# Patient Record
Sex: MALE | URBAN - METROPOLITAN AREA
[De-identification: names, ages, dates, MRNs, and addresses within clinical notes are randomized per-mention and may not be internally consistent; named-entity substitution may affect disease eponyms.]

---

## 2022-11-22 ENCOUNTER — OFFICE VISIT (OUTPATIENT)
Dept: ORTHOPEDIC SURGERY | Age: 52
End: 2022-11-22
Payer: MEDICARE

## 2022-11-22 VITALS
HEIGHT: 75 IN | DIASTOLIC BLOOD PRESSURE: 70 MMHG | TEMPERATURE: 97.2 F | HEART RATE: 70 BPM | WEIGHT: 158.4 LBS | SYSTOLIC BLOOD PRESSURE: 104 MMHG | OXYGEN SATURATION: 98 % | BODY MASS INDEX: 19.7 KG/M2 | RESPIRATION RATE: 18 BRPM

## 2022-11-22 DIAGNOSIS — G89.29 CHRONIC BILATERAL LOW BACK PAIN WITHOUT SCIATICA: Primary | ICD-10-CM

## 2022-11-22 DIAGNOSIS — M54.50 CHRONIC BILATERAL LOW BACK PAIN WITHOUT SCIATICA: Primary | ICD-10-CM

## 2022-11-22 PROBLEM — I10 HYPERTENSION: Status: ACTIVE | Noted: 2022-11-22

## 2022-11-22 PROBLEM — F39 EPISODIC MOOD DISORDER (HCC): Status: ACTIVE | Noted: 2021-02-24

## 2022-11-22 PROCEDURE — 3078F DIAST BP <80 MM HG: CPT | Performed by: PHYSICAL MEDICINE & REHABILITATION

## 2022-11-22 PROCEDURE — 3017F COLORECTAL CA SCREEN DOC REV: CPT | Performed by: PHYSICAL MEDICINE & REHABILITATION

## 2022-11-22 PROCEDURE — 3074F SYST BP LT 130 MM HG: CPT | Performed by: PHYSICAL MEDICINE & REHABILITATION

## 2022-11-22 PROCEDURE — 99203 OFFICE O/P NEW LOW 30 MIN: CPT | Performed by: PHYSICAL MEDICINE & REHABILITATION

## 2022-11-22 PROCEDURE — G8420 CALC BMI NORM PARAMETERS: HCPCS | Performed by: PHYSICAL MEDICINE & REHABILITATION

## 2022-11-22 PROCEDURE — G8427 DOCREV CUR MEDS BY ELIG CLIN: HCPCS | Performed by: PHYSICAL MEDICINE & REHABILITATION

## 2022-11-22 PROCEDURE — G8432 DEP SCR NOT DOC, RNG: HCPCS | Performed by: PHYSICAL MEDICINE & REHABILITATION

## 2022-11-22 RX ORDER — HYDROCHLOROTHIAZIDE 12.5 MG/1
12.5 CAPSULE ORAL DAILY
COMMUNITY
Start: 2022-08-16

## 2022-11-22 RX ORDER — CHLORHEXIDINE GLUCONATE 1.2 MG/ML
RINSE ORAL
COMMUNITY
Start: 2022-10-04

## 2022-11-22 RX ORDER — LOPERAMIDE HYDROCHLORIDE 2 MG/1
2 CAPSULE ORAL
COMMUNITY
Start: 2022-05-12

## 2022-11-22 RX ORDER — NAPROXEN 500 MG/1
500 TABLET ORAL 2 TIMES DAILY
COMMUNITY
Start: 2022-08-02 | End: 2022-11-22 | Stop reason: ALTCHOICE

## 2022-11-22 RX ORDER — DEXTROMETHORPHAN HYDROBROMIDE, GUAIFENESIN 5; 100 MG/5ML; MG/5ML
650 LIQUID ORAL
COMMUNITY
Start: 2022-10-04

## 2022-11-22 RX ORDER — METHYLPREDNISOLONE 4 MG/1
TABLET ORAL
COMMUNITY
Start: 2022-10-04 | End: 2022-11-22

## 2022-11-22 RX ORDER — ONDANSETRON 4 MG/1
4 TABLET, ORALLY DISINTEGRATING ORAL
COMMUNITY
Start: 2022-09-22

## 2022-11-22 RX ORDER — FLUOXETINE HYDROCHLORIDE 20 MG/1
20 CAPSULE ORAL DAILY
COMMUNITY
Start: 2022-10-04

## 2022-11-22 RX ORDER — OMEPRAZOLE 20 MG/1
CAPSULE, DELAYED RELEASE ORAL
COMMUNITY
Start: 2022-10-04

## 2022-11-22 RX ORDER — ARIPIPRAZOLE 5 MG/1
5 TABLET ORAL DAILY
COMMUNITY
Start: 2022-10-04

## 2022-11-22 RX ORDER — METOCLOPRAMIDE 10 MG/1
10 TABLET ORAL
COMMUNITY
Start: 2022-02-22

## 2022-11-22 RX ORDER — PROMETHAZINE HYDROCHLORIDE 25 MG/1
SUPPOSITORY RECTAL
COMMUNITY
Start: 2022-08-29

## 2022-11-22 RX ORDER — IBUPROFEN 800 MG/1
TABLET ORAL
COMMUNITY
Start: 2022-10-04 | End: 2022-11-22

## 2022-11-22 RX ORDER — TRAZODONE HYDROCHLORIDE 50 MG/1
TABLET ORAL
COMMUNITY
Start: 2022-10-01

## 2022-11-22 RX ORDER — AMLODIPINE BESYLATE 10 MG/1
10 TABLET ORAL DAILY
COMMUNITY
Start: 2022-02-14

## 2022-11-22 RX ORDER — CLINDAMYCIN HYDROCHLORIDE 300 MG/1
CAPSULE ORAL
COMMUNITY
Start: 2022-10-04

## 2022-11-22 NOTE — PROGRESS NOTES
[address]  [address], [address]  [address]  Phone: [phone]  Fax: [phone]      Patient: Windy Tellez                                                                              MRN: [id number]        YOB: 1970          AGE: 46 y.o. PCP: Lisbet Gonzales NP  Date:  11/22/22    Reason for Consultation: Back Pain      HPI:  Windy Tellez is a 46 y.o. male with relevant PMH of HTN who presents with low back pain. He works at [employer], normally a . June 2, 2022 he was unloading a truck of frozen food and bent over to  a box and felt sharp pain. [facility]. He then went to [address] [place] [address] and did 10- weeks of PT. He had an MRI of his lumbar spine which demonstrates. He was then referred for a functional capacity evaluation   He was out of work for 5 weeks and returned to work light duty and after recent functional capacity evaluation he returned to work full duty      Neurologic symptoms: No numbness, tingling, weakness, bowel or bladder changes. No recent falls      Location: The pain is located in the low back pain    Radiation: The pain does not radiate . Pain Score: Currently: 7/10    Quality: Pain is of a Achy quality. Aggravating: Pain is exacerbated by standing and bending, twisting  Alleviating:  The pain is alleviated by sitting, lying down, and exercise    Prior Treatments:  Physical therapy: YES 10 weeks at [facility] orthopedic  Functional capacity evaluation [facility]  Toradol injection patient first    Injections:NO    Previous Medications: ibuprofen, naproxen, tylenol , medrol pack   Current Medications:  Previous work-up has included:   MRI lumbar spine 8/23/22-  Mild leveloscoliosis, mild disc desiccation L3-4, L2-3 mild disc extrusion, L3-4 mild anterior disc bulge, L4-5 mild disc bulge      Past Medical History: Past Medical History:   Diagnosis Date    Back pain       Past Surgical History: No past surgical history on file. SocHx:   Social History     Tobacco Use    Smoking status: Every Day     Packs/day: 1.00     Years: 20.00     Pack years: 20.00     Types: Cigarettes    Smokeless tobacco: Never   Substance Use Topics    Alcohol use: Never      FamHx:? No family history on file. Current Medications:    Current Outpatient Medications   Medication Sig Dispense Refill    acetaminophen (TYLENOL) 650 mg TbER Take 650 mg by mouth every eight (8) hours as needed. amLODIPine (NORVASC) 10 mg tablet Take 10 mg by mouth daily. ARIPiprazole (ABILIFY) 5 mg tablet Take 5 mg by mouth daily. chlorhexidine (PERIDEX) 0.12 % solution RINSE AND SPIT 15ML TWICE A DAY      clindamycin (CLEOCIN) 300 mg capsule TAKE 1 CAPSULE BY MOUTH 4 TIMES A DAY UNTIL GONE      FLUoxetine (PROzac) 20 mg capsule Take 20 mg by mouth daily. hydroCHLOROthiazide (MICROZIDE) 12.5 mg capsule Take 12.5 mg by mouth daily. ibuprofen (MOTRIN) 800 mg tablet TAKE 1 TABLET BY MOUTH EVERY 6 TO 8 HOURS AS NEEDED FOR PAIN      loperamide (IMODIUM) 2 mg capsule Take 2 mg by mouth every six (6) hours as needed. methylPREDNISolone (MEDROL DOSEPACK) 4 mg tablet TAKE 6 TABLETS ON DAY 1 AS DIRECTED ON PACKAGE AND DECREASE BY 1 TAB EACH DAY FOR A TOTAL OF 6 DAYS      metoclopramide HCl (REGLAN) 10 mg tablet Take 10 mg by mouth. naproxen (NAPROSYN) 500 mg tablet Take 500 mg by mouth two (2) times a day. omeprazole (PRILOSEC) 20 mg capsule TAKE 1 CAPSULE BY MOUTH ONCE A DAY 30 MIN BEFORE BREAKFAST FOR 90 DAYS      ondansetron (ZOFRAN ODT) 4 mg disintegrating tablet Take 4 mg by mouth every eight (8) hours as needed.       promethazine (PHENERGAN) 25 mg suppository INSERT 1 SUPPOSITORY RECTALLY EVERY SIX HOURS AS NEEDED FOR VOMITING      traZODone (DESYREL) 50 mg tablet TAKE ONE-HALF TO ONE TABLET BY MOUTH EVERY NIGHT        Allergies: Allergies   Allergen Reactions    Lisinopril Anaphylaxis and Swelling     Other reaction(s): anaphylaxis/angioedema          Review of Systems:   Gen:    Denied fevers, chills, malaise, fatigue, weight changes   Resp: Denied shortness of breath, cough, wheezing   CVS: Denied chest pain, palpitations   : Denied urinary urgency, frequency, incontinence   GI: Denied nausea, vomiting, constipation, diarrhea   Skin: Denied rashes, wounds   Psych: Denied anxiety, depression   Vasc: Denied claudication, ulcers   Hem: Denied easy bruising/bleeding   MSK: See HPI   Neuro: See HPI         Physical Exam     Vital Signs: Visit Vitals  /70 (BP 1 Location: Left upper arm, BP Patient Position: Sitting, BP Cuff Size: Small adult)   Pulse 70   Temp 97.2 °F (36.2 °C) (Temporal)   Resp 18   Ht 6' 3\" (1.905 m)   Wt 158 lb 6.4 oz (71.8 kg)   SpO2 98% Comment: RA   BMI 19.80 kg/m²      General: ??????? Well nourished and well developed male without any acute distress   Psychiatric: ?  Alert and oriented x 3 with normal mood    HEENT: ???????? Atraumatic   Respiratory:   Breathing non-labored and non dyspneic   CV: ???????????????? Peripheral pulses intact, no peripheral edema   Skin: ????????????? No rashes       Neurologic: ??       Sensation: normal and grossly intact thebilateral, lower extremity(s)   Strength: 5/5 in the bilateral, lower extremity(s)   Reflexes: reveals 2+ symmetric DTRs throughout   Gait: normal     Musculoskeletal: Lumbar Exam     Inspection:   Alignment: Normal  Atrophy: None       Tenderness to Palpation:   Lumbar paraspinals Positive  Lumbar spinous processes Negative  SI Joint:  Negative  Gluteal:Negative   Greater trochanter: Negative      ROM:   Lumbar ROM: Abnormal pain with flexion/extension  Lumbar facet loading: Negative  Hip ROM: No reproduction of pain with movement     Special Tests      Slump test: Negative  SLR: Negative  NEELAM: Negative  FADIR: Negative  Log Roll: Negative      Medical Decision Making:    Images: The imaging results as well as the actual images of the studies below were reviewed, visualized and interpreted by me. Labs: The results below were reviewed. MRI lumbar spine 8/23/22-  Mild leveloscoliosis, mild disc desiccation L3-4, L2-3 mild disc extrusion, L3-4 mild anterior disc bulge, L4-5 mild disc bulge     Assessment:   Mechanical low back pain    Plan:      -Physical therapy -  continue HEP  -Medications - NA. Counseled regarding side effects and appropriate administration of medications.    -Diagnostics/Imaging - Reviewed MRI   -Injections - NA   -Lifestyle - Encouraged core strengthening, LE flexibility,   -Education - The patient's diagnosis, prognosis and treatment options were discussed today. All questions were answered.     F/U - prn        [facility]

## 2022-11-22 NOTE — PROGRESS NOTES
Odilia Del Toro presents today for   Chief Complaint   Patient presents with    Back Pain       Is someone accompanying this pt? NO    Is the patient using any DME equipment during OV? NO    Depression Screening:  No flowsheet data found. Learning Assessment:  No flowsheet data found. Abuse Screening:  No flowsheet data found. Fall Risk  No flowsheet data found. OPIOID RISK TOOL  No flowsheet data found. Coordination of Care:  1. Have you been to the ER, urgent care clinic since your last visit? YES CHEST PAIN  Hospitalized since your last visit? NO    2. Have you seen or consulted any other health care providers outside of the [address] [place] [address] since your last visit? NO Include any pap smears or colon screening.  NO

## 2022-12-08 ENCOUNTER — VIRTUAL VISIT (OUTPATIENT)
Dept: ORTHOPEDIC SURGERY | Age: 52
End: 2022-12-08
Payer: MEDICARE

## 2022-12-08 DIAGNOSIS — M54.50 CHRONIC BILATERAL LOW BACK PAIN WITHOUT SCIATICA: Primary | ICD-10-CM

## 2022-12-08 DIAGNOSIS — G89.29 CHRONIC BILATERAL LOW BACK PAIN WITHOUT SCIATICA: Primary | ICD-10-CM

## 2022-12-08 NOTE — PROGRESS NOTES
[address]  [address], [address]  [address]  Phone: [phone]  Fax: [phone]      Patient: Misti Landaverde                                                                              MRN: [id number]        YOB: 1970          AGE: 46 y.o. PCP: Judge Victoriano NP  Date:  12/08/22    Reason for Consultation: Back Pain      HPI:  Misti Landaverde is a 46 y.o. male with relevant PMH of HTN who presents with low back pain. He works at [employer], normally a . June 2, 2022 he was unloading a truck of frozen food and bent over to  a box and felt sharp pain. He went to [facility] first and had an x-ray of his lumbar spine. He then went to [address] [place] [address] and did 10- weeks of PT. He had an MRI of his lumbar spine which demonstrates. He was then referred for a functional capacity evaluation   He was out of work for 5 weeks and returned to work light duty and after recent functional capacity evaluation he returned to work full duty. Today he reports increased pain in his low back and states he does not think he will be able to work       Neurologic symptoms: No numbness, tingling, weakness, bowel or bladder changes. No recent falls      Location: The pain is located in the low back pain    Radiation: The pain does not radiate . Pain Score: Currently: 7/10    Quality: Pain is of a Achy quality. Aggravating: Pain is exacerbated by standing and bending, twisting  Alleviating:  The pain is alleviated by sitting, lying down, and exercise    Prior Treatments:  Physical therapy: YES 10 weeks at [facility] orthopedic  Functional capacity evaluation [facility]  Toradol injection patient first    Injections:NO    Previous Medications: ibuprofen, naproxen, tylenol , medrol pack   Current Medications:  Previous work-up has included:   MRI lumbar spine 8/23/22-  Mild leveloscoliosis, mild disc desiccation L3-4, L2-3 mild disc extrusion, L3-4 mild anterior disc bulge, L4-5 mild disc bulge      Past Medical History:   Past Medical History:   Diagnosis Date    Back pain       Past Surgical History: History reviewed. No pertinent surgical history. SocHx:   Social History     Tobacco Use    Smoking status: Every Day     Packs/day: 1.00     Years: 20.00     Pack years: 20.00     Types: Cigarettes    Smokeless tobacco: Never   Substance Use Topics    Alcohol use: Never      FamHx:? History reviewed. No pertinent family history. Current Medications:    Current Outpatient Medications   Medication Sig Dispense Refill    acetaminophen (TYLENOL) 650 mg TbER Take 650 mg by mouth every eight (8) hours as needed. amLODIPine (NORVASC) 10 mg tablet Take 10 mg by mouth daily. ARIPiprazole (ABILIFY) 5 mg tablet Take 5 mg by mouth daily. chlorhexidine (PERIDEX) 0.12 % solution RINSE AND SPIT 15ML TWICE A DAY      clindamycin (CLEOCIN) 300 mg capsule TAKE 1 CAPSULE BY MOUTH 4 TIMES A DAY UNTIL GONE      FLUoxetine (PROzac) 20 mg capsule Take 20 mg by mouth daily. hydroCHLOROthiazide (MICROZIDE) 12.5 mg capsule Take 12.5 mg by mouth daily. loperamide (IMODIUM) 2 mg capsule Take 2 mg by mouth every six (6) hours as needed. metoclopramide HCl (REGLAN) 10 mg tablet Take 10 mg by mouth. omeprazole (PRILOSEC) 20 mg capsule TAKE 1 CAPSULE BY MOUTH ONCE A DAY 30 MIN BEFORE BREAKFAST FOR 90 DAYS      ondansetron (ZOFRAN ODT) 4 mg disintegrating tablet Take 4 mg by mouth every eight (8) hours as needed. promethazine (PHENERGAN) 25 mg suppository INSERT 1 SUPPOSITORY RECTALLY EVERY SIX HOURS AS NEEDED FOR VOMITING      traZODone (DESYREL) 50 mg tablet TAKE ONE-HALF TO ONE TABLET BY MOUTH EVERY NIGHT        Allergies: Allergies   Allergen Reactions    Lisinopril Anaphylaxis and Swelling     Other reaction(s): anaphylaxis/angioedema        Medical Decision Making:    Images:  The imaging results as well as the actual images of the studies below were reviewed, visualized and interpreted by me. Labs: The results below were reviewed. MRI lumbar spine 8/23/22-  Mild leveloscoliosis, mild disc desiccation L3-4, L2-3 mild disc extrusion, L3-4 mild anterior disc bulge, L4-5 mild disc bulge     Assessment:   Mechanical low back pain    Plan:      -Physical therapy -  encouraged him to continue core strengthening, exercise program   -Discussed using lumbar support as needed but would not use regularly   -Medications - NA. Counseled regarding side effects and appropriate administration of medications.    -Diagnostics/Imaging - Reviewed MRI   -Injections - NA   -Lifestyle - Encouraged core strengthening, LE flexibility,   -Education - The patient's diagnosis, prognosis and treatment options were discussed today. All questions were answered. F/U - prn        [address] [place] [address] [facility]    I was in the office while conducting this encounter. Patient not in the office    Consent:  He and/or his healthcare decision maker is aware that this patient-initiated Telehealth encounter is a billable service, with coverage as determined by his insurance carrier. He is aware that he may receive a bill and has provided verbal consent to proceed: Yes    This virtual visit was conducted telephone encounter only. -  I affirm this is a Patient Initiated Episode with an Established Patient who has not had a related appointment within my department in the past 7 days or scheduled within the next 24 hours. Note: this encounter is not billable if this call serves to triage the patient into an appointment for the relevant concern. Total Time: minutes: 5-10 minutes.

## 2023-08-28 ENCOUNTER — APPOINTMENT (OUTPATIENT)
Facility: HOSPITAL | Age: 53
End: 2023-08-28
Payer: MEDICAID

## 2023-08-28 ENCOUNTER — HOSPITAL ENCOUNTER (EMERGENCY)
Facility: HOSPITAL | Age: 53
Discharge: HOME | End: 2023-08-28
Attending: STUDENT IN AN ORGANIZED HEALTH CARE EDUCATION/TRAINING PROGRAM
Payer: MEDICAID

## 2023-08-28 VITALS
DIASTOLIC BLOOD PRESSURE: 100 MMHG | RESPIRATION RATE: 20 BRPM | TEMPERATURE: 98.4 F | HEART RATE: 75 BPM | BODY MASS INDEX: 19.89 KG/M2 | WEIGHT: 160 LBS | SYSTOLIC BLOOD PRESSURE: 119 MMHG | HEIGHT: 75 IN | OXYGEN SATURATION: 100 %

## 2023-08-28 DIAGNOSIS — S39.012A BACK STRAIN, INITIAL ENCOUNTER: ICD-10-CM

## 2023-08-28 DIAGNOSIS — S39.012A STRAIN OF LUMBAR REGION, INITIAL ENCOUNTER: Primary | ICD-10-CM

## 2023-08-28 PROCEDURE — 96372 THER/PROPH/DIAG INJ SC/IM: CPT

## 2023-08-28 PROCEDURE — 6360000002 HC RX W HCPCS: Performed by: STUDENT IN AN ORGANIZED HEALTH CARE EDUCATION/TRAINING PROGRAM

## 2023-08-28 PROCEDURE — 99284 EMERGENCY DEPT VISIT MOD MDM: CPT

## 2023-08-28 PROCEDURE — 6370000000 HC RX 637 (ALT 250 FOR IP): Performed by: STUDENT IN AN ORGANIZED HEALTH CARE EDUCATION/TRAINING PROGRAM

## 2023-08-28 PROCEDURE — 72131 CT LUMBAR SPINE W/O DYE: CPT

## 2023-08-28 RX ORDER — HYDROCODONE BITARTRATE AND ACETAMINOPHEN 5; 325 MG/1; MG/1
1 TABLET ORAL
Status: COMPLETED | OUTPATIENT
Start: 2023-08-28 | End: 2023-08-28

## 2023-08-28 RX ORDER — KETOROLAC TROMETHAMINE 15 MG/ML
15 INJECTION, SOLUTION INTRAMUSCULAR; INTRAVENOUS
Status: COMPLETED | OUTPATIENT
Start: 2023-08-28 | End: 2023-08-28

## 2023-08-28 RX ORDER — NAPROXEN 500 MG/1
500 TABLET ORAL 2 TIMES DAILY WITH MEALS
Qty: 28 TABLET | Refills: 0 | Status: SHIPPED | OUTPATIENT
Start: 2023-08-28 | End: 2023-09-11

## 2023-08-28 RX ADMIN — KETOROLAC TROMETHAMINE 15 MG: 15 INJECTION, SOLUTION INTRAMUSCULAR; INTRAVENOUS at 06:59

## 2023-08-28 RX ADMIN — HYDROCODONE BITARTRATE AND ACETAMINOPHEN 1 TABLET: 5; 325 TABLET ORAL at 07:02

## 2023-08-28 ASSESSMENT — ENCOUNTER SYMPTOMS
BACK PAIN: 1
BLOOD IN STOOL: 0
EYE PAIN: 0
SHORTNESS OF BREATH: 0
DIARRHEA: 0
ABDOMINAL PAIN: 0
FACIAL SWELLING: 0
ABDOMINAL DISTENTION: 0
CONSTIPATION: 0
CHEST TIGHTNESS: 0

## 2023-08-28 ASSESSMENT — PAIN SCALES - GENERAL: PAINLEVEL_OUTOF10: 7

## 2023-08-28 ASSESSMENT — PAIN DESCRIPTION - LOCATION: LOCATION: BACK

## 2023-08-28 NOTE — ED NOTES
D/C by coworker per reports patient understanding of D/C paperwork      Karen Mahmood  08/28/23 [id number]

## 2023-08-28 NOTE — ED TRIAGE NOTES
Pt arrives via EMS with complaint of back pain. Pt was assisting his roommate up after they fell out the wheelchair and pt states he pulled his back. Pt was able to move themselves from EMS stretcher to ed bed. Pt is alert and oriented.

## 2023-08-28 NOTE — ED NOTES
Patient in bed, VSS on cont monitoring. Call hicks in reach. Jean Claude RN phone to call wife.      Fozia Hayes [place]  [phone]

## 2023-08-28 NOTE — DISCHARGE INSTRUCTIONS
You were evaluated for back sprain. Based on your work-up it was deemed that she was stable for discharge. Please  your medication of naproxen which was prescribed to you. Please follow-up with your primary care physician if you have any further concerns and go over your work-up. If you experience any chest pain, shortness of breath, worsening abdominal pain, vomiting blood, worsening headache, seizures, or any worsening of your symptoms please return to the emergency department immediately. If you have any pending results or any further questions please contact the emergency department at [phone].